# Patient Record
Sex: FEMALE | Race: WHITE | Employment: FULL TIME
[De-identification: names, ages, dates, MRNs, and addresses within clinical notes are randomized per-mention and may not be internally consistent; named-entity substitution may affect disease eponyms.]

---

## 2017-06-24 ENCOUNTER — HEALTH MAINTENANCE LETTER (OUTPATIENT)
Age: 37
End: 2017-06-24

## 2019-10-02 ENCOUNTER — HEALTH MAINTENANCE LETTER (OUTPATIENT)
Age: 39
End: 2019-10-02

## 2020-07-31 ENCOUNTER — OFFICE VISIT (OUTPATIENT)
Dept: URGENT CARE | Facility: URGENT CARE | Age: 40
End: 2020-07-31
Payer: COMMERCIAL

## 2020-07-31 VITALS
HEART RATE: 60 BPM | SYSTOLIC BLOOD PRESSURE: 102 MMHG | WEIGHT: 162 LBS | TEMPERATURE: 99 F | BODY MASS INDEX: 26.55 KG/M2 | DIASTOLIC BLOOD PRESSURE: 62 MMHG

## 2020-07-31 DIAGNOSIS — B37.9 ANTIBIOTIC-INDUCED YEAST INFECTION: ICD-10-CM

## 2020-07-31 DIAGNOSIS — R82.90 NONSPECIFIC FINDING ON EXAMINATION OF URINE: ICD-10-CM

## 2020-07-31 DIAGNOSIS — N30.01 ACUTE CYSTITIS WITH HEMATURIA: Primary | ICD-10-CM

## 2020-07-31 DIAGNOSIS — R31.9 HEMATURIA, UNSPECIFIED TYPE: ICD-10-CM

## 2020-07-31 DIAGNOSIS — T36.95XA ANTIBIOTIC-INDUCED YEAST INFECTION: ICD-10-CM

## 2020-07-31 LAB
ALBUMIN UR-MCNC: >=300 MG/DL
APPEARANCE UR: CLEAR
BACTERIA #/AREA URNS HPF: ABNORMAL /HPF
BILIRUB UR QL STRIP: NEGATIVE
COLOR UR AUTO: YELLOW
GLUCOSE UR STRIP-MCNC: NEGATIVE MG/DL
HGB UR QL STRIP: ABNORMAL
KETONES UR STRIP-MCNC: NEGATIVE MG/DL
LEUKOCYTE ESTERASE UR QL STRIP: ABNORMAL
NITRATE UR QL: POSITIVE
NON-SQ EPI CELLS #/AREA URNS LPF: ABNORMAL /LPF
PH UR STRIP: 8.5 PH (ref 5–7)
RBC #/AREA URNS AUTO: >100 /HPF
SOURCE: ABNORMAL
SP GR UR STRIP: 1.02 (ref 1–1.03)
UROBILINOGEN UR STRIP-ACNC: 1 EU/DL (ref 0.2–1)
WBC #/AREA URNS AUTO: ABNORMAL /HPF

## 2020-07-31 PROCEDURE — 87088 URINE BACTERIA CULTURE: CPT | Performed by: PHYSICIAN ASSISTANT

## 2020-07-31 PROCEDURE — 87186 SC STD MICRODIL/AGAR DIL: CPT | Performed by: PHYSICIAN ASSISTANT

## 2020-07-31 PROCEDURE — 99203 OFFICE O/P NEW LOW 30 MIN: CPT | Performed by: PHYSICIAN ASSISTANT

## 2020-07-31 PROCEDURE — 87086 URINE CULTURE/COLONY COUNT: CPT | Performed by: PHYSICIAN ASSISTANT

## 2020-07-31 PROCEDURE — 81001 URINALYSIS AUTO W/SCOPE: CPT | Performed by: PHYSICIAN ASSISTANT

## 2020-07-31 RX ORDER — FLUCONAZOLE 150 MG/1
TABLET ORAL
Qty: 2 TABLET | Refills: 0 | Status: SHIPPED | OUTPATIENT
Start: 2020-07-31

## 2020-07-31 RX ORDER — ACETAMINOPHEN 160 MG/5ML
300 SUSPENSION, ORAL (FINAL DOSE FORM) ORAL 3 TIMES DAILY
COMMUNITY

## 2020-07-31 RX ORDER — SULFAMETHOXAZOLE/TRIMETHOPRIM 800-160 MG
1 TABLET ORAL 2 TIMES DAILY
Qty: 10 TABLET | Refills: 0 | Status: SHIPPED | OUTPATIENT
Start: 2020-07-31 | End: 2020-08-05

## 2020-07-31 RX ORDER — FAMOTIDINE 20 MG
TABLET ORAL
COMMUNITY

## 2020-07-31 RX ORDER — FERROUS GLUCONATE 324(38)MG
324 TABLET ORAL
COMMUNITY

## 2020-07-31 RX ORDER — ASCORBIC ACID 100 MG
TABLET,CHEWABLE ORAL
COMMUNITY

## 2020-07-31 RX ORDER — LORATADINE 10 MG/1
10 TABLET ORAL DAILY
COMMUNITY

## 2020-07-31 ASSESSMENT — ENCOUNTER SYMPTOMS
EYE REDNESS: 0
SORE THROAT: 0
WHEEZING: 0
PALPITATIONS: 0
ABDOMINAL PAIN: 0
TROUBLE SWALLOWING: 0
SINUS PRESSURE: 0
CHILLS: 0
HEMATURIA: 1
VOMITING: 0
ARTHRALGIAS: 0
FLANK PAIN: 0
COUGH: 0
DYSURIA: 1
FEVER: 0
NAUSEA: 0
FREQUENCY: 1
DIARRHEA: 0
BACK PAIN: 0
RHINORRHEA: 0
CHEST TIGHTNESS: 0
FATIGUE: 0
MYALGIAS: 0
UNEXPECTED WEIGHT CHANGE: 0
SHORTNESS OF BREATH: 0
EYE PAIN: 0

## 2020-07-31 NOTE — PROGRESS NOTES
SUBJECTIVE:   Anabelle Souza is a 40 year old female presenting with a chief complaint of   Chief Complaint   Patient presents with     Hematuria     started today.  Felt an urge to urinate went and was a lot of blood.  Some burning after urinating.  Is just getting over menses.  Has been working outside, walks 13 miles a day.        She is a new patient of [facility].    UTI    Onset of symptoms was 1day(s).  Course of illness is same  Severity moderate  Current and associated symptoms dysuria, frequency and blood in urine  Treatment and measures tried None  Predisposing factors include none  Patient denies rigors, flank pain, temperature > 101 degrees F. and vomiting          Review of Systems   Constitutional: Negative for chills, fatigue, fever and unexpected weight change.   HENT: Negative for congestion, ear pain, postnasal drip, rhinorrhea, sinus pressure, sore throat and trouble swallowing.    Eyes: Negative for pain, redness and visual disturbance.   Respiratory: Negative for cough, chest tightness, shortness of breath and wheezing.    Cardiovascular: Negative for chest pain and palpitations.   Gastrointestinal: Negative for abdominal pain, diarrhea, nausea and vomiting.   Genitourinary: Positive for dysuria, frequency and hematuria. Negative for flank pain, vaginal discharge and vaginal pain.   Musculoskeletal: Negative for arthralgias, back pain and myalgias.   Skin: Negative for rash.       Past Medical History:   Diagnosis Date     LUMBAR DISC DISPLACEMENT      Other and unspecified ovarian cyst 1998     Other closed fracture of tarsal and metatarsal bones 2002    Right      Other kyphoscoliosis and scoliosis      Family History   Problem Relation Age of Onset     Obesity Mother         resolved?     Respiratory Mother         asthma     Cancer Mother 54        lung cancer     Heart Disease Father         MI age 35     Musculoskeletal Disorder Father      Hypertension Maternal Grandmother       Cerebrovascular Disease Maternal Grandmother      Osteoporosis Maternal Grandmother      Lipids Maternal Grandmother      Heart Disease Maternal Grandmother 86     Cancer Maternal Grandfather         skin     Alzheimer Disease Maternal Grandfather      Hypertension Paternal Grandfather      Psychotic Disorder Paternal Grandmother         Depression      Breast Cancer No family hx of      Cancer - colorectal No family hx of      Diabetes No family hx of      Neurologic Disorder No family hx of      Thyroid Disease No family hx of      Current Outpatient Medications   Medication Sig Dispense Refill     Ascorbic Acid (VITAMIN C) 100 MG CHEW        Cyanocobalamin (VITAMIN B12 PO) Take 5,000 mg by mouth.       ferrous gluconate (FERGON) 324 (38 Fe) MG tablet Take 324 mg by mouth daily (with breakfast)       fluconazole (DIFLUCAN) 150 MG tablet Take 1 tablet by mouth x 1; May repeat in 1 week if still symptomatic 2 tablet 0     loratadine (CLARITIN) 10 MG tablet Take 10 mg by mouth daily       Multiple Vitamins-Minerals (HAIR/SKIN/NAILS/BIOTIN PO)        Multiple Vitamins-Minerals (MULTIVITAMIN ADULT PO)        st estrada wort 300 MG TABS tablet Take 300 mg by mouth 3 times daily       sulfamethoxazole-trimethoprim (BACTRIM DS) 800-160 MG tablet Take 1 tablet by mouth 2 times daily for 5 days 10 tablet 0     Vitamin D, Cholecalciferol, 25 MCG (1000 UT) CAPS        Zinc Sulfate (ZINC 15 PO)        acetaminophen (TYLENOL) 325 MG tablet Take 1-2 tablets by mouth as needed.       ALBUTEROL IN Inhale  into the lungs.       Social History     Tobacco Use     Smoking status: Former Smoker     Packs/day: 1.00     Years: 6.00     Pack years: 6.00     Types: Cigarettes     Smokeless tobacco: Never Used   Substance Use Topics     Alcohol use: No     Comment: 2 shots at bedtime daily       OBJECTIVE  /62 (BP Location: Right arm, Patient Position: Chair, Cuff Size: Adult Regular)   Pulse 60   Temp 99  F (37.2  C) (Tympanic)    Wt 73.5 kg (162 lb)   BMI 26.55 kg/m      Physical Exam  Constitutional:       General: She is not in acute distress.     Appearance: She is well-developed.   HENT:      Head: Normocephalic.      Right Ear: Tympanic membrane and ear canal normal.      Left Ear: Tympanic membrane and ear canal normal.   Eyes:      Conjunctiva/sclera: Conjunctivae normal.      Pupils: Pupils are equal, round, and reactive to light.   Cardiovascular:      Rate and Rhythm: Normal rate.      Heart sounds: Normal heart sounds.   Pulmonary:      Effort: Pulmonary effort is normal.      Breath sounds: Normal breath sounds.   Abdominal:      Tenderness: There is no right CVA tenderness or left CVA tenderness.   Skin:     General: Skin is warm and dry.      Findings: No rash.   Psychiatric:         Behavior: Behavior normal.         Labs:  No results found for this or any previous visit (from the past 24 hour(s)).    X-Ray was not done.    ASSESSMENT:      ICD-10-CM    1. Acute cystitis with hematuria  N30.01 sulfamethoxazole-trimethoprim (BACTRIM DS) 800-160 MG tablet   2. Hematuria, unspecified type  R31.9 *UA reflex to Microscopic and Culture ([place] (except [place] and [place])     Urine Microscopic   3. Nonspecific finding on examination of urine  R82.90 Urine Culture Aerobic Bacterial   4. Antibiotic-induced yeast infection  B37.9 fluconazole (DIFLUCAN) 150 MG tablet    T36.95XA         Medical Decision Making:    Differential Diagnosis:  UTI: UTI, Dysuria, Kidney Stone and Interstitial Cystitis    Serious Comorbid Conditions:  Adult:  None    PLAN:    UTI Adult:  Will treat with Bactrim two times daily x 5 days. Push fluids. Return to clinic if symptoms worsen or do not improve; otherwise follow up as needed      Followup:    If not improving or if condition worsens, follow up with your Primary Care Provider    There are no Patient Instructions on file for this visit.

## 2020-08-02 LAB
BACTERIA SPEC CULT: ABNORMAL
Lab: ABNORMAL
SPECIMEN SOURCE: ABNORMAL

## 2021-05-28 ENCOUNTER — RECORDS - HEALTHEAST (OUTPATIENT)
Dept: ADMINISTRATIVE | Facility: CLINIC | Age: 41
End: 2021-05-28

## 2025-02-03 NOTE — NURSING NOTE
"Chief Complaint   Patient presents with     Hematuria     started today.  Felt an urge to urinate went and was a lot of blood.  Some burning after urinating.  Is just getting over menses.  Has been working outside, walks 13 miles a day.        Initial /62 (BP Location: Right arm, Patient Position: Chair, Cuff Size: Adult Regular)   Pulse 60   Temp 99  F (37.2  C) (Tympanic)   Wt 73.5 kg (162 lb)   BMI 26.55 kg/m   Estimated body mass index is 26.55 kg/m  as calculated from the following:    Height as of 3/3/14: 1.664 m (5' 5.5\").    Weight as of this encounter: 73.5 kg (162 lb).    Patient presents to the clinic using No DME    Health Maintenance that is potentially due pending provider review:  NONE    n/a    Is there anyone who you would like to be able to receive your results? No  If yes have patient fill out JOSIE  Gay Herrera M.A.      " [Expressed Breast milk ___oz/feed] : [unfilled] oz of expressed breast milk per feed [Formula ___ oz/feed] : [unfilled] oz of formula per feed [Normal] : Normal [No] : No cigarette smoke exposure [Exposure to electronic nicotine delivery system] : No exposure to electronic nicotine delivery system [Water heater temperature set at <120 degrees F] : Water heater temperature set at <120 degrees F [Rear facing car seat in back seat] : Rear facing car seat in back seat [Carbon Monoxide Detectors] : Carbon monoxide detectors at home [Smoke Detectors] : Smoke detectors at home. [At risk for exposure to TB] : Not at risk for exposure to Tuberculosis